# Patient Record
Sex: MALE | HISPANIC OR LATINO | URBAN - METROPOLITAN AREA
[De-identification: names, ages, dates, MRNs, and addresses within clinical notes are randomized per-mention and may not be internally consistent; named-entity substitution may affect disease eponyms.]

---

## 2022-11-28 ENCOUNTER — OFFICE (OUTPATIENT)
Dept: URBAN - METROPOLITAN AREA CLINIC 63 | Facility: CLINIC | Age: 47
Setting detail: OPHTHALMOLOGY
End: 2022-11-28
Payer: MEDICAID

## 2022-11-28 DIAGNOSIS — H02.831: ICD-10-CM

## 2022-11-28 DIAGNOSIS — H02.834: ICD-10-CM

## 2022-11-28 DIAGNOSIS — H25.13: ICD-10-CM

## 2022-11-28 PROCEDURE — 92012 INTRM OPH EXAM EST PATIENT: CPT | Performed by: STUDENT IN AN ORGANIZED HEALTH CARE EDUCATION/TRAINING PROGRAM

## 2022-11-28 ASSESSMENT — REFRACTION_AUTOREFRACTION
OD_AXIS: 000
OD_CYLINDER: 0.00
OD_SPHERE: +1.50
OS_CYLINDER: -0.25
OS_AXIS: 061
OS_SPHERE: +1.00

## 2022-11-28 ASSESSMENT — REFRACTION_CURRENTRX
OD_SPHERE: +0.75
OS_OVR_VA: 20/
OD_AXIS: 180
OS_ADD: +1.50
OS_AXIS: 180
OS_SPHERE: +0.75
OD_OVR_VA: 20/
OS_VPRISM_DIRECTION: PROGS
OD_CYLINDER: 0.00
OS_CYLINDER: 0.00
OD_VPRISM_DIRECTION: PROGS
OD_ADD: +1.50

## 2022-11-28 ASSESSMENT — VISUAL ACUITY
OS_BCVA: 20/20
OD_BCVA: 20/20

## 2022-11-28 ASSESSMENT — TEAR BREAK UP TIME (TBUT)
OD_TBUT: T
OS_TBUT: T

## 2022-11-28 ASSESSMENT — SPHEQUIV_DERIVED
OS_SPHEQUIV: 0.875
OD_SPHEQUIV: 1.5

## 2022-11-28 ASSESSMENT — KERATOMETRY
OD_K2POWER_DIOPTERS: 42.00
OS_K2POWER_DIOPTERS: 41.50
OS_AXISANGLE_DEGREES: 036
OS_K1POWER_DIOPTERS: 41.00
OD_K1POWER_DIOPTERS: 41.25
OD_AXISANGLE_DEGREES: 119

## 2022-11-28 ASSESSMENT — TONOMETRY
OS_IOP_MMHG: 18
OD_IOP_MMHG: 19

## 2022-11-28 ASSESSMENT — AXIALLENGTH_DERIVED
OD_AL: 23.6967
OS_AL: 24.0876

## 2022-11-28 ASSESSMENT — CONFRONTATIONAL VISUAL FIELD TEST (CVF)
OD_FINDINGS: FULL
OS_FINDINGS: FULL

## 2022-11-28 ASSESSMENT — LID EXAM ASSESSMENTS
OS_MEIBOMITIS: LLL 1+
OD_MEIBOMITIS: RLL 1+

## 2022-11-28 ASSESSMENT — LID POSITION - DERMATOCHALASIS
OD_DERMATOCHALASIS: RUL 1+
OS_DERMATOCHALASIS: LUL 1+

## 2023-03-08 PROBLEM — Z00.00 ENCOUNTER FOR PREVENTIVE HEALTH EXAMINATION: Status: ACTIVE | Noted: 2023-03-08

## 2024-04-02 ENCOUNTER — APPOINTMENT (OUTPATIENT)
Dept: GASTROENTEROLOGY | Facility: CLINIC | Age: 49
End: 2024-04-02
Payer: MEDICAID

## 2024-04-02 VITALS
HEIGHT: 66.93 IN | BODY MASS INDEX: 37.35 KG/M2 | RESPIRATION RATE: 16 BRPM | HEART RATE: 70 BPM | WEIGHT: 238 LBS | OXYGEN SATURATION: 98 % | DIASTOLIC BLOOD PRESSURE: 80 MMHG | SYSTOLIC BLOOD PRESSURE: 130 MMHG

## 2024-04-02 DIAGNOSIS — Z78.9 OTHER SPECIFIED HEALTH STATUS: ICD-10-CM

## 2024-04-02 DIAGNOSIS — Z12.11 ENCOUNTER FOR SCREENING FOR MALIGNANT NEOPLASM OF COLON: ICD-10-CM

## 2024-04-02 PROCEDURE — 99204 OFFICE O/P NEW MOD 45 MIN: CPT

## 2024-04-02 RX ORDER — POLYETHYLENE GLYCOL-3350 AND ELECTROLYTES WITH FLAVOR PACK 240; 5.84; 2.98; 6.72; 22.72 G/278.26G; G/278.26G; G/278.26G; G/278.26G; G/278.26G
240 POWDER, FOR SOLUTION ORAL
Qty: 1 | Refills: 0 | Status: ACTIVE | COMMUNITY
Start: 2024-04-02 | End: 1900-01-01

## 2024-04-02 NOTE — PHYSICAL EXAM
[Alert] : alert [Normal Voice/Communication] : normal voice/communication [Healthy Appearing] : healthy appearing [No Acute Distress] : no acute distress [Sclera] : the sclera and conjunctiva were normal [Hearing Threshold Finger Rub Not Luce] : hearing was normal [Normal Lips/Gums] : the lips and gums were normal [Oropharynx] : the oropharynx was normal [Normal Appearance] : the appearance of the neck was normal [No Neck Mass] : no neck mass was observed [No Respiratory Distress] : no respiratory distress [No Acc Muscle Use] : no accessory muscle use [Respiration, Rhythm And Depth] : normal respiratory rhythm and effort [Auscultation Breath Sounds / Voice Sounds] : lungs were clear to auscultation bilaterally [Heart Rate And Rhythm] : heart rate was normal and rhythm regular [Normal S1, S2] : normal S1 and S2 [Bowel Sounds] : normal bowel sounds [Murmurs] : no murmurs [No Masses] : no abdominal mass palpated [Abdomen Tenderness] : non-tender [] : no hepatosplenomegaly [Abdomen Soft] : soft [Oriented To Time, Place, And Person] : oriented to person, place, and time

## 2024-04-03 NOTE — ADDENDUM
[FreeTextEntry1] : I, Tressa Celaya NP, acted as scribe for Dr. Isaiah Cheung for this patient encounter

## 2024-04-03 NOTE — ASSESSMENT
[FreeTextEntry1] : 48 year old male of average risk for colorectal neoplasm presenting for colon cancer screening. He will be scheduled for a colonoscopy with Gavilyte prep. I have discussed the indications (including but not limited to ruling out inflammatory bowel disease, colorectal neoplasm, GI bleed, and AVM's), benefits, risks  (including but not limited to reaction to the anesthesia, infection, bleeding, missed lesions, and perforation),  and alternatives to colonoscopy with the patient. The patient understands all options and has agreed to have a colonoscopy and is medically optimized for the planned procedure.   Obtain labs from PCP for review  GI attending: History, physical, assessment, plan as outlined above represents my understanding of this case.  SHARON NP served as my scribe.  48-year-old  male non-English speaking presents for a screening colonoscopy.  He is asymptomatic from a GI point of view.  Family history is negative for colorectal neoplasia.  Only past surgical history is knee surgery.  He takes no medications.  Physical exam is unrevealing.  He is at average risk for development of colorectal neoplasia.  There are no cardiopulmonary contraindications.  Appropriate candidate for screening colonoscopy.  ASA #1.  Proceed as above.

## 2024-04-03 NOTE — HISTORY OF PRESENT ILLNESS
[FreeTextEntry1] : KTOA SANDOVAL is a 48 year old male with no PMH and on no medications, presenting today for colon cancer screening. He has never had a colonoscopy before. No family history of colon cancer or polyps. He feels well and offers no complaints. He denies abdominal pain, bowel changes, melena, hematochezia, weight loss. He moves his bowels regularly. No upper GI complaints.

## 2024-08-13 PROBLEM — E66.8 MODERATE OBESITY: Status: RESOLVED | Noted: 2024-08-13 | Resolved: 2024-08-13

## 2024-08-14 ENCOUNTER — APPOINTMENT (OUTPATIENT)
Dept: GASTROENTEROLOGY | Facility: GI CENTER | Age: 49
End: 2024-08-14

## 2024-08-14 DIAGNOSIS — E66.8 OTHER OBESITY: ICD-10-CM

## 2024-08-14 DIAGNOSIS — Z12.11 ENCOUNTER FOR SCREENING FOR MALIGNANT NEOPLASM OF COLON: ICD-10-CM
